# Patient Record
Sex: FEMALE | Race: OTHER | Employment: OTHER | URBAN - METROPOLITAN AREA
[De-identification: names, ages, dates, MRNs, and addresses within clinical notes are randomized per-mention and may not be internally consistent; named-entity substitution may affect disease eponyms.]

---

## 2022-03-07 NOTE — PATIENT DISCUSSION
IOP slightly elevated at today's appointment but still WNL. Will continue to monitor without drop therapy at this time.

## 2022-08-16 ENCOUNTER — CONSULTATION/EVALUATION (OUTPATIENT)
Dept: URBAN - METROPOLITAN AREA CLINIC 39 | Facility: CLINIC | Age: 77
End: 2022-08-16

## 2022-08-16 DIAGNOSIS — H04.123: ICD-10-CM

## 2022-08-16 DIAGNOSIS — H18.513: ICD-10-CM

## 2022-08-16 DIAGNOSIS — H25.813: ICD-10-CM

## 2022-08-16 PROCEDURE — 92286 ANT SGM IMG I&R SPECLR MIC: CPT

## 2022-08-16 PROCEDURE — 92136TC INTERFEROMETRY - TECHNICAL COMPONENT

## 2022-08-16 PROCEDURE — 99204 OFFICE O/P NEW MOD 45 MIN: CPT

## 2022-08-16 PROCEDURE — V2799PMN IMPRIMIS PRED-MOXI-NEPAF 5ML

## 2022-08-16 PROCEDURE — 92015 DETERMINE REFRACTIVE STATE: CPT

## 2022-08-16 RX ORDER — PREDNISOLONE ACETATE 10 MG/ML: 1 SUSPENSION/ DROPS OPHTHALMIC

## 2022-08-16 RX ORDER — MOXIFLOXACIN HYDROCHLORIDE 5 MG/ML: 1 SOLUTION/ DROPS OPHTHALMIC

## 2022-08-16 ASSESSMENT — VISUAL ACUITY
OS_SC: J8
OS_PH: 20/50+2
OD_SC: 20/60-2
OS_AM: 20/25-1
OD_SC: J8
OD_RAM: 20/25
OD_CC: J5
OS_BAT: <20/400 WITH MR
OD_PH: 20/40-1
OD_BAT: 20/50 WITH MR
OS_CC: J6
OS_SC: 20/80

## 2022-08-16 ASSESSMENT — TONOMETRY
OD_IOP_MMHG: 11
OS_IOP_MMHG: 12

## 2022-09-13 ENCOUNTER — PRE-OP/H&P (OUTPATIENT)
Dept: URBAN - METROPOLITAN AREA CLINIC 39 | Facility: CLINIC | Age: 77
End: 2022-09-13

## 2022-09-13 ENCOUNTER — SURGERY/PROCEDURE (OUTPATIENT)
Dept: URBAN - METROPOLITAN AREA CLINIC 39 | Facility: CLINIC | Age: 77
End: 2022-09-13

## 2022-09-13 DIAGNOSIS — H25.813: ICD-10-CM

## 2022-09-13 DIAGNOSIS — H18.513: ICD-10-CM

## 2022-09-13 DIAGNOSIS — H04.123: ICD-10-CM

## 2022-09-13 PROCEDURE — 99211T TECH SERVICE

## 2022-09-13 PROCEDURE — 66984 XCAPSL CTRC RMVL W/O ECP: CPT

## 2022-09-14 ENCOUNTER — POST OP/EVAL OF SECOND EYE (OUTPATIENT)
Dept: URBAN - METROPOLITAN AREA CLINIC 39 | Facility: CLINIC | Age: 77
End: 2022-09-14

## 2022-09-14 DIAGNOSIS — H18.513: ICD-10-CM

## 2022-09-14 DIAGNOSIS — H25.811: ICD-10-CM

## 2022-09-14 DIAGNOSIS — Z96.1: ICD-10-CM

## 2022-09-14 DIAGNOSIS — H04.123: ICD-10-CM

## 2022-09-14 PROCEDURE — 92012 INTRM OPH EXAM EST PATIENT: CPT

## 2022-09-14 PROCEDURE — 99024 POSTOP FOLLOW-UP VISIT: CPT

## 2022-09-14 ASSESSMENT — TONOMETRY
OD_IOP_MMHG: 12
OS_IOP_MMHG: 13

## 2022-09-14 ASSESSMENT — VISUAL ACUITY
OU_SC: 20/40
OS_SC: 20/30-2
OD_PH: 20/40-2
OD_SC: 20/60

## 2022-09-20 ENCOUNTER — PRE-OP/H&P (OUTPATIENT)
Dept: URBAN - METROPOLITAN AREA CLINIC 39 | Facility: CLINIC | Age: 77
End: 2022-09-20

## 2022-09-20 ENCOUNTER — SURGERY/PROCEDURE (OUTPATIENT)
Dept: URBAN - METROPOLITAN AREA CLINIC 39 | Facility: CLINIC | Age: 77
End: 2022-09-20

## 2022-09-20 DIAGNOSIS — H25.812: ICD-10-CM

## 2022-09-20 DIAGNOSIS — H25.811: ICD-10-CM

## 2022-09-20 DIAGNOSIS — Z96.1: ICD-10-CM

## 2022-09-20 PROCEDURE — 99211T TECH SERVICE

## 2022-09-20 PROCEDURE — 66984 XCAPSL CTRC RMVL W/O ECP: CPT

## 2022-09-20 ASSESSMENT — VISUAL ACUITY
OS_SC: 20/20-2
OS_SC: J10
OD_BAT: 20/50
OD_SC: J8
OD_SC: 20/60-2
OD_RAM: 20/25

## 2022-09-20 ASSESSMENT — TONOMETRY: OS_IOP_MMHG: 10

## 2022-09-21 ENCOUNTER — POST-OP (OUTPATIENT)
Dept: URBAN - METROPOLITAN AREA CLINIC 35 | Facility: CLINIC | Age: 77
End: 2022-09-21

## 2022-09-21 DIAGNOSIS — Z96.1: ICD-10-CM

## 2022-09-21 PROCEDURE — 99024 POSTOP FOLLOW-UP VISIT: CPT

## 2022-09-21 RX ORDER — PREDNISOLONE ACETATE 10 MG/ML: 1 SUSPENSION/ DROPS OPHTHALMIC

## 2022-09-21 ASSESSMENT — VISUAL ACUITY
OD_SC: 20/30-1
OS_SC: 20/20-2
OU_SC: 20/20-1

## 2022-09-21 ASSESSMENT — TONOMETRY
OD_IOP_MMHG: 15
OS_IOP_MMHG: 15

## 2022-11-16 ENCOUNTER — POST-OP (OUTPATIENT)
Dept: URBAN - METROPOLITAN AREA CLINIC 35 | Facility: CLINIC | Age: 77
End: 2022-11-16

## 2022-11-16 DIAGNOSIS — H25.812: ICD-10-CM

## 2022-11-16 DIAGNOSIS — Z96.1: ICD-10-CM

## 2022-11-16 PROCEDURE — 99024 POSTOP FOLLOW-UP VISIT: CPT

## 2022-11-16 ASSESSMENT — KERATOMETRY
OD_K1POWER_DIOPTERS: 43.50
OS_AXISANGLE2_DEGREES: 180
OD_AXISANGLE2_DEGREES: 5
OS_K1POWER_DIOPTERS: 43.75
OD_AXISANGLE_DEGREES: 95
OS_K2POWER_DIOPTERS: 44.50
OD_K2POWER_DIOPTERS: 43.75
OS_AXISANGLE_DEGREES: 90

## 2022-11-16 ASSESSMENT — VISUAL ACUITY
OD_SC: 20/30
OU_SC: 20/20
OS_SC: 20/20-

## 2022-11-16 ASSESSMENT — TONOMETRY
OD_IOP_MMHG: 11
OS_IOP_MMHG: 10

## 2023-04-04 ENCOUNTER — COMPREHENSIVE EXAM (OUTPATIENT)
Dept: URBAN - METROPOLITAN AREA CLINIC 35 | Facility: CLINIC | Age: 78
End: 2023-04-04

## 2023-04-04 DIAGNOSIS — H18.513: ICD-10-CM

## 2023-04-04 DIAGNOSIS — H04.123: ICD-10-CM

## 2023-04-04 DIAGNOSIS — H25.812: ICD-10-CM

## 2023-04-04 PROCEDURE — 92014 COMPRE OPH EXAM EST PT 1/>: CPT

## 2023-04-04 ASSESSMENT — KERATOMETRY
OS_AXISANGLE_DEGREES: 90
OS_K1POWER_DIOPTERS: 43.75
OD_AXISANGLE2_DEGREES: 5
OS_K2POWER_DIOPTERS: 44.50
OD_K2POWER_DIOPTERS: 43.75
OS_AXISANGLE2_DEGREES: 180
OD_AXISANGLE_DEGREES: 95
OD_K1POWER_DIOPTERS: 43.50

## 2023-04-04 ASSESSMENT — VISUAL ACUITY
OS_SC: 20/25
OS_CC: J1
OU_CC: J1
OD_CC: J1
OD_SC: 20/25
OU_SC: 20/25-2

## 2023-04-04 ASSESSMENT — TONOMETRY
OD_IOP_MMHG: 10
OS_IOP_MMHG: 10

## 2024-08-22 ENCOUNTER — COMPREHENSIVE EXAM (OUTPATIENT)
Dept: URBAN - METROPOLITAN AREA CLINIC 35 | Facility: CLINIC | Age: 79
End: 2024-08-22

## 2024-08-22 DIAGNOSIS — H04.123: ICD-10-CM

## 2024-08-22 DIAGNOSIS — H18.513: ICD-10-CM

## 2024-08-22 PROCEDURE — 92014 COMPRE OPH EXAM EST PT 1/>: CPT

## 2024-08-22 ASSESSMENT — VISUAL ACUITY
OS_SC: 20/20-1
OU_SC: J1-
OD_SC: J4
OD_SC: 20/30
OS_SC: J3
OU_SC: 20/25+2

## 2024-08-22 ASSESSMENT — TONOMETRY
OS_IOP_MMHG: 11
OD_IOP_MMHG: 10